# Patient Record
(demographics unavailable — no encounter records)

---

## 2025-02-05 NOTE — REASON FOR VISIT
[Annual] : an annual visit. [TextEntry] : MENSES HAVE BEEN MONTHLY UNTIL RECENTLY.  BLEEDING 12/29, 1/8, 1/30.  FLOW HAS BEEN HEAVY BUT BLEEDING LASTS 3-4 DAYS.

## 2025-03-12 NOTE — PROCEDURE
[Hysteroscopy] : Hysteroscopy [Time out performed] : Pre-procedure time out performed.  Patient's name, date of birth and procedure confirmed. [Consent Obtained] : Consent obtained [Abnormal uterine bleeding] : abnormal uterine bleeding [Risks] : risks [Benefits] : benefits [Alternatives] : alternatives [Patient] : patient [Infection] : infection [Bleeding] : bleeding [Allergic Reaction] : allergic reaction [Lidocaine___ mL] : [unfilled] ~UmL of lidocaine [flexible] : Using aseptic technique a hysteroscopy was performed using a flexible hysteroscope [Sent to Pathology] : specimen was placed in buffered formalin and sent for pathology [Hemostasis obtained] : hemostasis obtained [Tolerated Well] : Patient tolerated the procedure well [Antibiotics given] : antibiotics not given [de-identified] : ENDO BX OBTAINED WITHOUT DIFFICULTY,  ENDOSEE ADVANCED INTO ENDOMETRIAL CAVITY WOTHOUT DIFFICULTY.  CAVITY DISTENDED W NS.  CAVITY WELL VISUALIZED.  NO LESIONS NOTED

## 2025-03-12 NOTE — PROCEDURE
[Hysteroscopy] : Hysteroscopy [Time out performed] : Pre-procedure time out performed.  Patient's name, date of birth and procedure confirmed. [Consent Obtained] : Consent obtained [Abnormal uterine bleeding] : abnormal uterine bleeding [Risks] : risks [Benefits] : benefits [Alternatives] : alternatives [Patient] : patient [Infection] : infection [Bleeding] : bleeding [Allergic Reaction] : allergic reaction [Lidocaine___ mL] : [unfilled] ~UmL of lidocaine [flexible] : Using aseptic technique a hysteroscopy was performed using a flexible hysteroscope [Sent to Pathology] : specimen was placed in buffered formalin and sent for pathology [Hemostasis obtained] : hemostasis obtained [Tolerated Well] : Patient tolerated the procedure well [Antibiotics given] : antibiotics not given [de-identified] : ENDO BX OBTAINED WITHOUT DIFFICULTY,  ENDOSEE ADVANCED INTO ENDOMETRIAL CAVITY WOTHOUT DIFFICULTY.  CAVITY DISTENDED W NS.  CAVITY WELL VISUALIZED.  NO LESIONS NOTED